# Patient Record
Sex: MALE | Race: WHITE | Employment: FULL TIME | URBAN - METROPOLITAN AREA
[De-identification: names, ages, dates, MRNs, and addresses within clinical notes are randomized per-mention and may not be internally consistent; named-entity substitution may affect disease eponyms.]

---

## 2017-11-02 ENCOUNTER — HOSPITAL ENCOUNTER (OUTPATIENT)
Dept: LAB | Age: 47
Discharge: HOME | End: 2017-11-02

## 2017-11-02 PROCEDURE — 88305 TISSUE EXAM BY PATHOLOGIST: CPT | Performed by: INTERNAL MEDICINE

## 2017-11-02 PROCEDURE — 88312 SPECIAL STAINS GROUP 1: CPT | Performed by: INTERNAL MEDICINE

## 2018-04-27 ENCOUNTER — HOSPITAL ENCOUNTER (OUTPATIENT)
Dept: LAB | Age: 48
Discharge: HOME | End: 2018-04-27
Payer: COMMERCIAL

## 2018-04-27 DIAGNOSIS — E11.9 TYPE 2 DIABETES MELLITUS WITHOUT COMPLICATION, UNSPECIFIED WHETHER LONG TERM INSULIN USE (HCC): ICD-10-CM

## 2018-04-27 DIAGNOSIS — I10 ESSENTIAL HYPERTENSION: ICD-10-CM

## 2018-04-27 DIAGNOSIS — R06.09 DYSPNEA ON EXERTION: ICD-10-CM

## 2018-04-27 DIAGNOSIS — E78.5 DYSLIPIDEMIA: ICD-10-CM

## 2018-04-27 DIAGNOSIS — R06.02 SOB (SHORTNESS OF BREATH): ICD-10-CM

## 2018-04-27 LAB
ALBUMIN SERPL-MCNC: 3.7 G/DL (ref 3.5–5)
ALBUMIN/GLOB SERPL: 0.9 {RATIO}
ALP SERPL-CCNC: 70 U/L (ref 50–136)
ALT SERPL-CCNC: 33 U/L (ref 12–65)
ANION GAP SERPL CALC-SCNC: 4 MMOL/L
AST SERPL-CCNC: 21 U/L (ref 15–37)
BASOPHILS # BLD: 0.1 K/UL (ref 0–0.2)
BASOPHILS NFR BLD: 1 % (ref 0–2)
BILIRUB SERPL-MCNC: 0.5 MG/DL (ref 0.2–1.1)
BUN SERPL-MCNC: 16 MG/DL (ref 6–23)
CALCIUM SERPL-MCNC: 8.9 MG/DL (ref 8.3–10.4)
CHLORIDE SERPL-SCNC: 104 MMOL/L (ref 98–107)
CHOLEST SERPL-MCNC: 173 MG/DL
CO2 SERPL-SCNC: 30 MMOL/L (ref 21–32)
CREAT SERPL-MCNC: 0.8 MG/DL (ref 0.8–1.5)
DIFFERENTIAL METHOD BLD: ABNORMAL
EOSINOPHIL # BLD: 0.1 K/UL (ref 0–0.8)
EOSINOPHIL NFR BLD: 2 % (ref 0.5–7.8)
ERYTHROCYTE [DISTWIDTH] IN BLOOD BY AUTOMATED COUNT: 13.6 % (ref 11.9–14.6)
GLOBULIN SER CALC-MCNC: 4.3 G/DL
GLUCOSE SERPL-MCNC: 181 MG/DL (ref 65–100)
HCT VFR BLD AUTO: 45.9 % (ref 41.1–50.3)
HDLC SERPL-MCNC: 51 MG/DL (ref 40–60)
HDLC SERPL: 3.4 {RATIO}
HGB BLD-MCNC: 15.5 G/DL (ref 13.6–17.2)
LDLC SERPL CALC-MCNC: 101.8 MG/DL
LIPID PROFILE,FLP: NORMAL
LYMPHOCYTES # BLD: 1.4 K/UL (ref 0.5–4.6)
LYMPHOCYTES NFR BLD: 19 % (ref 13–44)
MCH RBC QN AUTO: 28 PG (ref 26.1–32.9)
MCHC RBC AUTO-ENTMCNC: 33.8 G/DL (ref 31.4–35)
MCV RBC AUTO: 83 FL (ref 79.6–97.8)
MONOCYTES # BLD: 0.6 K/UL (ref 0.1–1.3)
MONOCYTES NFR BLD: 8 % (ref 4–12)
NEUTS SEG # BLD: 4.9 K/UL (ref 1.7–8.2)
NEUTS SEG NFR BLD: 70 % (ref 43–78)
PLATELET # BLD AUTO: 204 K/UL (ref 150–450)
PMV BLD AUTO: 10.2 FL (ref 10.8–14.1)
POTASSIUM SERPL-SCNC: 4 MMOL/L (ref 3.5–5.1)
PROT SERPL-MCNC: 8 G/DL (ref 6.3–8.2)
RBC # BLD AUTO: 5.53 M/UL (ref 4.23–5.67)
SODIUM SERPL-SCNC: 138 MMOL/L (ref 136–145)
TRIGL SERPL-MCNC: 101 MG/DL (ref 35–150)
TSH SERPL DL<=0.005 MIU/L-ACNC: 1.46 UIU/ML (ref 0.36–3.74)
VLDLC SERPL CALC-MCNC: 20.2 MG/DL (ref 6–23)
WBC # BLD AUTO: 7 K/UL (ref 4.3–11.1)

## 2018-04-27 PROCEDURE — 80053 COMPREHEN METABOLIC PANEL: CPT | Performed by: INTERNAL MEDICINE

## 2018-04-27 PROCEDURE — 36415 COLL VENOUS BLD VENIPUNCTURE: CPT | Performed by: INTERNAL MEDICINE

## 2018-04-27 PROCEDURE — 84443 ASSAY THYROID STIM HORMONE: CPT | Performed by: INTERNAL MEDICINE

## 2018-04-27 PROCEDURE — 80061 LIPID PANEL: CPT | Performed by: INTERNAL MEDICINE

## 2018-04-27 PROCEDURE — 85025 COMPLETE CBC W/AUTO DIFF WBC: CPT | Performed by: INTERNAL MEDICINE

## 2018-04-30 RX ORDER — TRAMADOL HYDROCHLORIDE 50 MG/1
50 TABLET ORAL 2 TIMES DAILY
COMMUNITY

## 2018-04-30 NOTE — PROGRESS NOTES
Called to pre-assess for [facility] poss with Dr Mariah Jarrett , Scheduled 5/1/18. No answer & message left.

## 2018-04-30 NOTE — PROGRESS NOTES
Patient pre-assessment complete for [facility] poss with Dr Elvis Yoon scheduled for 18 at 9:30am , arrival time 7:30am. Patient verified using . Patient instructed to bring all home medications in labeled bottles on the day of procedure. NPO status reinforced. Patient informed to take a full dose aspirin 325mg  or 81 mg x 4 on the day of procedure. Patient instructed to HOLD metformin (last dose 18) & HOLD lasic & HCTZ in am . Instructed they can take all other medications excluding vitamins & supplements. Patient verbalizes understanding of all instructions & denies any questions at this time.

## 2018-05-01 ENCOUNTER — HOSPITAL ENCOUNTER (OUTPATIENT)
Dept: CARDIAC CATH/INVASIVE PROCEDURES | Age: 48
Discharge: HOME | End: 2018-05-01
Attending: INTERNAL MEDICINE | Admitting: INTERNAL MEDICINE
Payer: COMMERCIAL

## 2018-05-01 VITALS
TEMPERATURE: 98 F | HEIGHT: 70 IN | HEART RATE: 83 BPM | WEIGHT: 315 LBS | DIASTOLIC BLOOD PRESSURE: 70 MMHG | RESPIRATION RATE: 18 BRPM | SYSTOLIC BLOOD PRESSURE: 135 MMHG | BODY MASS INDEX: 45.1 KG/M2 | OXYGEN SATURATION: 95 %

## 2018-05-01 LAB — GLUCOSE BLD STRIP.AUTO-MCNC: 255 MG/DL (ref 65–100)

## 2018-05-01 PROCEDURE — 74011250636 HC RX REV CODE- 250/636

## 2018-05-01 PROCEDURE — 77030015766

## 2018-05-01 PROCEDURE — 77030019569 HC BND COMPR RAD TERU -B

## 2018-05-01 PROCEDURE — 74011250637 HC RX REV CODE- 250/637: Performed by: INTERNAL MEDICINE

## 2018-05-01 PROCEDURE — 74011250636 HC RX REV CODE- 250/636: Performed by: INTERNAL MEDICINE

## 2018-05-01 PROCEDURE — 77030004534 HC CATH ANGI DX INFN CARD -A

## 2018-05-01 PROCEDURE — 82962 GLUCOSE BLOOD TEST: CPT

## 2018-05-01 PROCEDURE — 74011636320 HC RX REV CODE- 636/320: Performed by: INTERNAL MEDICINE

## 2018-05-01 PROCEDURE — 99152 MOD SED SAME PHYS/QHP 5/>YRS: CPT

## 2018-05-01 PROCEDURE — C1769 GUIDE WIRE: HCPCS

## 2018-05-01 PROCEDURE — 93458 L HRT ARTERY/VENTRICLE ANGIO: CPT

## 2018-05-01 PROCEDURE — 74011000250 HC RX REV CODE- 250: Performed by: INTERNAL MEDICINE

## 2018-05-01 PROCEDURE — C1893 INTRO/SHEATH, FIXED,NON-PEEL: HCPCS

## 2018-05-01 RX ORDER — GUAIFENESIN 100 MG/5ML
81-324 LIQUID (ML) ORAL
Status: COMPLETED | OUTPATIENT
Start: 2018-05-01 | End: 2018-05-01

## 2018-05-01 RX ORDER — LIDOCAINE HYDROCHLORIDE 20 MG/ML
60-140 INJECTION, SOLUTION INFILTRATION; PERINEURAL ONCE
Status: COMPLETED | OUTPATIENT
Start: 2018-05-01 | End: 2018-05-01

## 2018-05-01 RX ORDER — SODIUM CHLORIDE 0.9 % (FLUSH) 0.9 %
5-10 SYRINGE (ML) INJECTION AS NEEDED
Status: DISCONTINUED | OUTPATIENT
Start: 2018-05-01 | End: 2018-05-01 | Stop reason: HOSPADM

## 2018-05-01 RX ORDER — SODIUM CHLORIDE 9 MG/ML
75 INJECTION, SOLUTION INTRAVENOUS CONTINUOUS
Status: DISCONTINUED | OUTPATIENT
Start: 2018-05-01 | End: 2018-05-01 | Stop reason: HOSPADM

## 2018-05-01 RX ORDER — MIDAZOLAM HYDROCHLORIDE 1 MG/ML
.5-2 INJECTION, SOLUTION INTRAMUSCULAR; INTRAVENOUS
Status: DISCONTINUED | OUTPATIENT
Start: 2018-05-01 | End: 2018-05-01 | Stop reason: HOSPADM

## 2018-05-01 RX ORDER — DIAZEPAM 5 MG/1
5 TABLET ORAL ONCE
Status: COMPLETED | OUTPATIENT
Start: 2018-05-01 | End: 2018-05-01

## 2018-05-01 RX ORDER — SODIUM CHLORIDE 0.9 % (FLUSH) 0.9 %
5-10 SYRINGE (ML) INJECTION EVERY 8 HOURS
Status: DISCONTINUED | OUTPATIENT
Start: 2018-05-01 | End: 2018-05-01 | Stop reason: HOSPADM

## 2018-05-01 RX ORDER — HEPARIN SODIUM 200 [USP'U]/100ML
3 INJECTION, SOLUTION INTRAVENOUS CONTINUOUS
Status: DISCONTINUED | OUTPATIENT
Start: 2018-05-01 | End: 2018-05-01 | Stop reason: HOSPADM

## 2018-05-01 RX ADMIN — ASPIRIN 81 MG 324 MG: 81 TABLET ORAL at 08:20

## 2018-05-01 RX ADMIN — MIDAZOLAM HYDROCHLORIDE 2 MG: 1 INJECTION, SOLUTION INTRAMUSCULAR; INTRAVENOUS at 09:19

## 2018-05-01 RX ADMIN — LIDOCAINE HYDROCHLORIDE 80 MG: 20 INJECTION, SOLUTION INFILTRATION; PERINEURAL at 09:21

## 2018-05-01 RX ADMIN — DIAZEPAM 5 MG: 5 TABLET ORAL at 08:28

## 2018-05-01 RX ADMIN — VERAPAMIL HYDROCHLORIDE 2 ML: 2.5 INJECTION INTRAVENOUS at 09:20

## 2018-05-01 RX ADMIN — SODIUM CHLORIDE 75 ML/HR: 900 INJECTION, SOLUTION INTRAVENOUS at 08:28

## 2018-05-01 RX ADMIN — HEPARIN SODIUM 3 ML/HR: 5000 INJECTION, SOLUTION INTRAVENOUS; SUBCUTANEOUS at 09:17

## 2018-05-01 RX ADMIN — IOPAMIDOL 70 ML: 755 INJECTION, SOLUTION INTRAVENOUS at 09:27

## 2018-05-01 NOTE — DISCHARGE INSTRUCTIONS
HEART CATHETERIZATION/ANGIOGRAPHY DISCHARGE INSTRUCTIONS    1. Check puncture site frequently for swelling or bleeding. If there is any bleeding, lie down and apply pressure over the area with a clean towel or washcloth. Call 911. Notify your doctor for any redness, swelling, drainage, or oozing from the puncture site. Notify your doctor for any fever or chills. 2. If the extremity becomes cold, numb, or painful call [facility] Cardiology at [phone].  3. Activity should be limited for the next 48 hours. Climb stairs as little as possible and avoid any stooping, bending, or strenuous activity for 48 hours. No heavy lifting (anything over 10 pounds) for 3 days. 4. You may resume your usual diet. Drink more fluids than usual.  5. Have a responsible person drive you home and stay with you for at least 24 hours after your heart catheterization/angiography. Do not drive for the next 24 hours. 6. You may remove bandage from your Right wrist in 24 hours. You may shower in 24 hours. No tub baths, hot tubs, or swimming for 1 week. Do not place any lotions, creams, powders, or ointments over puncture site for 1 week. You may place a clean band-aid over the puncture site each day for 5 days. Change daily. 7. Please continue your medications as prescribed by your physician. I have read the above instructions and have had the opportunity to ask questions.       Patient: ________________________   Date: 5/1/2018    Witness: _______________________   Date: 5/1/2018

## 2018-05-01 NOTE — PROGRESS NOTES
Patient received to [address] [place] [address] room # 10  Ambulatory from [facility]. Patient scheduled for [facility] today with Dr Buffy Son. Procedure reviewed & questions answered, voiced good understanding consent obtained & placed on chart. All medications and medical history reviewed. Will prep patient per orders. Patient & family updated on plan of care. The patient has a fraility score of 3-MANAGING WELL, based on independent of ADLs/ambulation, increased shortness of breath with ambulation.

## 2018-05-01 NOTE — PROGRESS NOTES
IP consult to Cardiac Rehab. Pt documented EF=60% on cath 5/1/18. EF must be 35% or less to qualify for Cardiac Rehab with diagnosis of chronic heart failure. We will contact the patient if a qualifying referral is received.      Thank you,  MADELYN HendricksN, RN  Cardiac Rehab Nurse Liaison

## 2018-05-01 NOTE — PROGRESS NOTES
TRANSFER - IN REPORT:    Verbal report received from [facility]) on [employer].  being received from cath lab(unit) for routine progression of care      Report consisted of patients Situation, Background, Assessment and   Recommendations(SBAR). Information from the following report(s) Procedure Summary was reviewed with the receiving nurse. Opportunity for questions and clarification was provided. Assessment completed upon patients arrival to unit and care assumed.

## 2018-05-01 NOTE — H&P
Progress Notes  Encounter Date: 4/27/2018  Magaly Garner MD   Cardiology   Expand All Collapse All    []Hide copied text  []Hover for attribution information  [address] [facility] [place]  [address], [address]  [address] [place] [address], [address] [place] [address]  PHONE: [phone]     SUBJECTIVE: /HPI  Vero Bazan (1970) is a 50 y.o. male seen for a follow up visit regarding the following:           ICD-10-CM ICD-9-CM   1. SOB (shortness of breath) R06.02 786.05   2. Essential hypertension I10 401.9   3. Dyslipidemia E78.5 272.4   4. Type 2 diabetes mellitus without complication, unspecified whether long term insulin use (HCC) E11.9 250.00   5. Dyspnea on exertion R06.09 786.09      Pt is experiencing more frequent sob. LEONE. Having weight gain and edema issues. Is now having NYHA class 3-4 symptoms. Pt reports they are  being compliant with meds. Experiencing PND and orthopnea.     Cannot lay flat. Leone with minimal exertion.           Past Medical History, Past Surgical History, Family history, Social History, and Medications were all reviewed with the patient today and updated as necessary.            Outpatient Prescriptions Marked as Taking for the 4/27/18 encounter (Office Visit) with Magaly Garner MD   Medication Sig Dispense Refill    amLODIPine-valsartan (EXFORGE) 5-320 mg per tablet Take 1 Tab by mouth daily.        carvedilol (COREG CR) 20 mg CR capsule Take  by mouth daily (with breakfast).         hydroCHLOROthiazide (HYDRODIURIL) 12.5 mg tablet Take 12.5 mg by mouth daily.        metFORMIN (GLUMETZA ER) 500 mg TG24 24 hour tablet Take 1,000 mg by mouth two (2) times a day.        traZODone (DESYREL) 50 mg tablet Take  by mouth nightly.        exenatide microspheres (BYDUREON) 2 mg/0.65 mL pnij 2 mg by SubCUTAneous route every seven (7) days.        escitalopram oxalate (LEXAPRO) 20 mg tablet Take 20 mg by mouth daily.        LORazepam (ATIVAN) 2 mg tablet Take  by mouth as needed for Anxiety.        aspirin delayed-release 81 mg tablet Take  by mouth daily.        furosemide (LASIX) 40 mg tablet Take 1 Tab by mouth daily. 90 Tab 3            Allergies   Allergen Reactions    Wellbutrin [Bupropion Hcl] Other (comments)       Bad thoughts      No past medical history on file. No past surgical history on file. No family history on file.         Social History   Substance Use Topics    Smoking status: Not on file    Smokeless tobacco: Not on file    Alcohol use Not on file      Pt does  have history of early onset cad or heart failure in immediate family members     ROS:  Review of Systems - General ROS: negative for - chills, fatigue or fever  Hematological and Lymphatic ROS: negative for - bleeding problems, bruising or jaundice  Respiratory ROS: positive for - shortness of breath  Cardiovascular ROS: positive for - chest pain and dyspnea on exertion  Gastrointestinal ROS: no abdominal pain, change in bowel habits, or black or bloody stools  Neurological ROS: no TIA or stroke symptoms  All other systems negative.            Wt Readings from Last 3 Encounters:   04/27/18 332 lb (150.6 kg)      Temp Readings from Last 3 Encounters:   No data found for Temp          BP Readings from Last 3 Encounters:   04/27/18 (!) 138/100          Pulse Readings from Last 3 Encounters:   04/27/18 81               PHYSICAL EXAM:       Visit Vitals    BP (!) 138/100    Pulse 81    Ht 5' 10\" (1.778 m)    Wt 332 lb (150.6 kg)    BMI 47.64 kg/m2         Physical Examination: General appearance - alert, well appearing, and in no distress  Mental status - alert, oriented to person, place, and time  Eyes - pupils equal and reactive, extraocular eye movements intact  Neck/lymph - supple, no significant adenopathy  Chest/lungs - clear to auscultation, no wheezes, rales or rhonchi, symmetric air entry  Heart/CV - normal rate, regular rhythm, normal S1, S2, no murmurs, rubs, clicks or gallops  Abdomen/GI - soft, nontender, nondistended, no masses or organomegaly  Musculoskeletal - no joint tenderness, deformity or swelling  Extremities - peripheral pulses normal, no pedal edema, no clubbing or cyanosis  Skin - normal coloration and turgor, no rashes, no suspicious skin lesions noted     EKG review nsr      Recent Results    No results found for this or any previous visit (from the past 672 hour(s)). No results found for: CHOL, CHOLPOCT, CHOLX, CHLST, CHOLV, HDL, HDLPOC, LDL, LDLCPOC, LDLC, DLDLP, VLDLC, VLDL, TGLX, TRIGL, TRIGP, TGLPOCT, CHHD, CHHDX        ASSESSMENT and PLAN           1. SOB (shortness of breath)  Getting worse. Needs echo cath and meds adjusted  - AMB POC EKG ROUTINE W/ 12 LEADS, INTER & REP     2. Essential hypertension  The current medical regimen is effective;  continue present plan and medications.        3. Dyslipidemia  The current medical regimen is effective;  continue present plan and medications.        4. Type 2 diabetes mellitus without complication, unspecified whether long term insulin use (HCC)  The current medical regimen is effective;  continue present plan and medications.        5.  Dyspnea on exertion  The current medical regimen is effective;  continue present plan and medications.                        Orders Placed This Encounter    CBC WITH AUTOMATED DIFF       Standing Status:   Future       Standing Expiration Date:   12/63/9094    METABOLIC PANEL, COMPREHENSIVE       Standing Status:   Future       Standing Expiration Date:   10/26/2018    TSH 3RD GENERATION       Standing Status:   Future       Standing Expiration Date:   10/26/2018    LIPID PANEL       Fasting sample requested       Standing Status:   Future       Standing Expiration Date:   10/26/2018   [facility] LEFT HEART CATH       Standing Status:   Future       Standing Expiration Date:   10/26/2018       Scheduling Instructions:         WITH POSSIBLE PCI       Order Specific Question:   Reason for Exam:       Answer:   cp sob    AMB POC EKG ROUTINE  LEADS, INTER & REP       Order Specific Question:   Reason for Exam:       Answer:   See diagnosis    amLODIPine-valsartan (EXFORGE) 5-320 mg per tablet       Sig: Take 1 Tab by mouth daily.  carvedilol (COREG CR) 20 mg CR capsule       Sig: Take  by mouth daily (with breakfast).  hydroCHLOROthiazide (HYDRODIURIL) 12.5 mg tablet       Sig: Take 12.5 mg by mouth daily.  metFORMIN (GLUMETZA ER) 500 mg TG24 24 hour tablet       Sig: Take 1,000 mg by mouth two (2) times a day.  traZODone (DESYREL) 50 mg tablet       Sig: Take  by mouth nightly.  exenatide microspheres (BYDUREON) 2 mg/0.65 mL pnij       Si mg by SubCUTAneous route every seven (7) days.  escitalopram oxalate (LEXAPRO) 20 mg tablet       Sig: Take 20 mg by mouth daily.  LORazepam (ATIVAN) 2 mg tablet       Sig: Take  by mouth as needed for Anxiety.  aspirin delayed-release 81 mg tablet       Sig: Take  by mouth daily.  furosemide (LASIX) 40 mg tablet       Sig: Take 1 Tab by mouth daily.       Dispense:  90 Tab       Refill:  3         Follow-up Disposition:  Return for after testing.              Raquel Abbott MD  2018  9:32 AM            Electronically signed by Raquel Abbott MD at 18 0238        Office Visit on 2018              Routing History              Detailed Report             Note shared with patient   Note Details   Author Raquel Abbott MD File Time 18 0254   Author Type Physician Status Signed   Last  Raquel Abbott MD Specialty Cardiology     Pt set up for procedure. Risks benefits and alternatives discussed. Pt agrees to proceed. Risks of bleeding infection emergent surgical procedure loss of life or limb renal failure and other known risks discussed. Pt agrees to proceed and agrees to sign consent form.

## 2018-05-01 NOTE — PROCEDURES
[address] CATH    Radha White  MR#: [id number]  : 1970  ACCOUNT #: [de-identified]   DATE OF SERVICE: 2018    PROCEDURE PERFORMED:  Left heart catheterization, selective coronary angiography, left ventriculogram.    INDICATIONS:  New onset class 4 angina and typical angina and heart failure symptoms. COMPLICATIONS:  None. ACCESS:  Right radial.      TIME:  9:19 to 9:30.      2 mg of Versed were given by Huyen King. Aortic pressure 114/97. LVEDP 14. CONTRAST:  70 mL. TIG4 and pigtail were used. FINDINGS:  Left ventriculogram done in [facility] projection shows normal left ventricular cavity size, normal ejection fraction, EF 60% with no wall motion abnormalities. There is no gradient on pullback. Left main arises normally bifurcates in LAD and circumflex. Left main is short and normal.    LAD courses to the apex, supplies a moderate to large diagonal.  The LAD and diagonal system is angiographically normal.    Circumflex artery and AV groove supplies three OMs. The circumflex and OMs are angiographically normal moderate size and nondominant. Right coronary artery is a dominant artery coursing in the AV groove supplies posterior descending and posterolateral.  The right system is angiographically normal.    CONCLUSIONS:  Angiographically normal coronary arteries with preserved left ventricular systolic function. The patient's symptoms appear to be noncardiac.       MD GARRY Villasenor / OWEN  D: 2018 09:35     T: 2018 15:54  JOB #: [id number]

## 2018-05-01 NOTE — PROGRESS NOTES
TRANSFER - OUT REPORT:    R radial diagnostic [facility] with  [address]  Versed 2mg  TR band 13ml at 0930  No bleeding or hematoma noted at site. Site soft    Verbal report given to Keyshawn(name) on Nestora Cough.  being transferred to CPRU(unit) for routine progression of care       Report consisted of patients Situation, Background, Assessment and   Recommendations(SBAR). Information from the following report(s) Procedure Summary was reviewed with the receiving nurse. Lines:   Peripheral IV 05/01/18 Right Antecubital (Active)       Peripheral IV 05/01/18 Left Hand (Active)        Opportunity for questions and clarification was provided.       Patient transported with:   Registered Nurse